# Patient Record
Sex: FEMALE | Race: WHITE | Employment: UNEMPLOYED | URBAN - METROPOLITAN AREA
[De-identification: names, ages, dates, MRNs, and addresses within clinical notes are randomized per-mention and may not be internally consistent; named-entity substitution may affect disease eponyms.]

---

## 2020-08-06 ENCOUNTER — OFFICE VISIT (OUTPATIENT)
Dept: CARDIOLOGY CLINIC | Age: 85
End: 2020-08-06
Payer: COMMERCIAL

## 2020-08-06 ENCOUNTER — HOSPITAL ENCOUNTER (OUTPATIENT)
Dept: CT IMAGING | Age: 85
Discharge: HOME | End: 2020-08-06
Attending: ORTHOPAEDIC SURGERY
Payer: MEDICARE

## 2020-08-06 VITALS
HEIGHT: 64 IN | HEART RATE: 108 BPM | DIASTOLIC BLOOD PRESSURE: 86 MMHG | BODY MASS INDEX: 26.29 KG/M2 | SYSTOLIC BLOOD PRESSURE: 132 MMHG | OXYGEN SATURATION: 96 % | WEIGHT: 154 LBS

## 2020-08-06 DIAGNOSIS — M16.12 PRIMARY OSTEOARTHRITIS OF LEFT HIP: ICD-10-CM

## 2020-08-06 DIAGNOSIS — Z01.810 PREOP CARDIOVASCULAR EXAM: ICD-10-CM

## 2020-08-06 DIAGNOSIS — I48.91 ATRIAL FIBRILLATION, UNSPECIFIED TYPE (HCC): ICD-10-CM

## 2020-08-06 DIAGNOSIS — I48.91 ATRIAL FIBRILLATION, UNSPECIFIED TYPE (HCC): Primary | ICD-10-CM

## 2020-08-06 PROCEDURE — G8427 DOCREV CUR MEDS BY ELIG CLIN: HCPCS | Performed by: SPECIALIST

## 2020-08-06 PROCEDURE — G8536 NO DOC ELDER MAL SCRN: HCPCS | Performed by: SPECIALIST

## 2020-08-06 PROCEDURE — 93000 ELECTROCARDIOGRAM COMPLETE: CPT | Performed by: SPECIALIST

## 2020-08-06 PROCEDURE — 1090F PRES/ABSN URINE INCON ASSESS: CPT | Performed by: SPECIALIST

## 2020-08-06 PROCEDURE — 1101F PT FALLS ASSESS-DOCD LE1/YR: CPT | Performed by: SPECIALIST

## 2020-08-06 PROCEDURE — 99204 OFFICE O/P NEW MOD 45 MIN: CPT | Performed by: SPECIALIST

## 2020-08-06 PROCEDURE — G8419 CALC BMI OUT NRM PARAM NOF/U: HCPCS | Performed by: SPECIALIST

## 2020-08-06 PROCEDURE — G8510 SCR DEP NEG, NO PLAN REQD: HCPCS | Performed by: SPECIALIST

## 2020-08-06 PROCEDURE — 73700 CT LOWER EXTREMITY W/O DYE: CPT

## 2020-08-06 RX ORDER — TRIAMTERENE/HYDROCHLOROTHIAZID 37.5-25 MG
TABLET ORAL DAILY
COMMUNITY

## 2020-08-06 RX ORDER — DILTIAZEM HYDROCHLORIDE 180 MG/1
180 CAPSULE, EXTENDED RELEASE ORAL DAILY
COMMUNITY

## 2020-08-06 NOTE — PROGRESS NOTES
PCP Jean-Claude Stone  Orthopaedic: Dr Bishop Pereira  Sx October 8, maybe moved  Hip CT scheduled today    Chest pain: no  Shortness of breath: no  Edema: YES  Palpitations: no  Dizziness: no

## 2020-08-06 NOTE — PROGRESS NOTES
LAST OFFICE VISIT : Visit date not found      No diagnosis found. ATTENTION:   This medical record was transcribed using an electronic medical records/speech recognition system. Although proofread, it may and can contain electronic, spelling and other errors. Corrections may be executed at a later time. Please feel free to contact us for any clarifications as needed. Dawson Uribe is a 80 y.o. male with  referred for cardiac preoperative risk stratification     . The patient denies chest pain/ shortness of breath, orthopnea, PND, LE edema, palpitations, syncope, presyncope or fatigue. I have personally obtained the history from the patient. HISTORY OF PRESENTING ILLNESS      Patient is a 89year-old lady with history of hypertension. She was followed in [facility]. She has non-smoker no history of hyperlipidemia or diabetes. She comes in with his son today. She states she has no chest pain or shortness of breath her cardiac work-up was negative about a year ago. Her rhythm is been stable she says no dizziness or lightheadedness and remains active but limited because of left hip pain. They come in for cardiac or stratification prior to her hip surgery       ACTIVE PROBLEM LIST     There are no active problems to display for this patient. PAST MEDICAL HISTORY     No past medical history on file. PAST SURGICAL HISTORY     No past surgical history on file. ALLERGIES     Not on File       FAMILY HISTORY     No family history on file. negative for cardiac disease       SOCIAL HISTORY            MEDICATIONS     No current outpatient medications on file. No current facility-administered medications for this visit. I have reviewed the nurses notes, vitals, problem list, allergy list, medical history, family, social history and medications. REVIEW OF SYMPTOMS      General: Pt denies excessive weight gain or loss.  Pt is able to conduct ADL's  HEENT: Denies blurred vision, headaches, hearing loss, epistaxis and difficulty swallowing. Respiratory: Denies cough, congestion, shortness of breath, LUU, wheezing or stridor. Cardiovascular: Denies precordial pain, palpitations, edema or PND  Gastrointestinal: Denies poor appetite, indigestion, abdominal pain or blood in stool  Genitourinary: Denies hematuria, dysuria, increased urinary frequency  Musculoskeletal: Denies joint pain or swelling from muscles or joints  Neurologic: Denies tremor, paresthesias, headache, or sensory motor disturbance  Psychiatric: Denies confusion, insomnia, depression  Integumentray: Denies rash, itching or ulcers. Hematologic: Denies easy bruising, bleeding     PHYSICAL EXAMINATION      There were no vitals filed for this visit. General: Well developed, in no acute distress. HEENT: No jaundice, oral mucosa moist, no oral ulcers  Neck: Supple, no stiffness, no lymphadenopathy, supple  Heart:  irregular  Respiratory: Clear bilaterally x 4, no wheezing or rales  Abdomen:   Soft, non-tender, bowel sounds are active.   Extremities:  Trace edema left ankle, normal cap refill, no cyanosis. Musculoskeletal: No clubbing, no deformities  Neuro: A&Ox3, speech clear, gait stable, cooperative, no focal neurologic deficits  Skin: Skin color is normal. No rashes or lesions. Non diaphoretic, moist.  Vascular: 2+ pulses symmetric in all extremities        EKG:      DIAGNOSTIC DATA     1.  Stress Test  4/23/19-Lexiscan- no ischemia, EF 67%    2. Echo  4/2019- normal EF, LAE, mild TR/MR           LABORATORY DATA          No results found for: WBC, HGBPOC, HGB, HGBP, HCTPOC, HCT, PHCT, RBCH, PLT, MCV, HGBEXT, HCTEXT, PLTEXT, HGBEXT, HCTEXT, PLTEXT   No results found for: NA, K, CL, CO2, AGAP, GLU, BUN, CREA, BUCR, GFRAA, GFRNA, CA, TBIL, TBILI, AP, TP, ALB, GLOB, AGRAT, ALT        ASSESSMENT/RECOMMENDATIONS:.      1.  Cardiac preoperative for stratification  -She has not had any cardiac symptoms or anginal equivalents and her recent stress test was in the last year so I favor going forward with her procedure and her hip replacement. I think she is a low cardiac operative risk and may proceed. I would hold the Eliquis 2 days prior to her procedure.  -She has some tachycardia may be just related to come to the office today but I am going to put a Holter monitor on her for 24 hours to see if she is persistently elevated and if she is and I would go ahead and increase her Cardizem from 1 80-2 40.  2.  Atrial fibrillation  -Initially diagnosed in April 2019  -She was placed on apixaban 2.5 mg twice a day  -See #1  3. Hypertension  -Is on triamterene/HCTZ  -BP is borderline  4. Breast cancer  5. I do not think I would order a cholesterol profile on her at this age    No orders of the defined types were placed in this encounter. I have discussed the diagnosis with  Van Rivero and the intended plan as seen in the above orders. Questions were answered concerning future plans. I have discussed medication side effects and warnings with the patient as well. Thank you,  No primary care provider on file. for involving me in the care of  Van Rivero. Please do not hesitate to contact me for further questions/concerns. Jaxon Henriquez MD, [facility] [place], [address] [place] [address]      [phone] / [phone] Fax

## 2020-08-10 ENCOUNTER — CLINICAL SUPPORT (OUTPATIENT)
Dept: CARDIOLOGY CLINIC | Age: 85
End: 2020-08-10

## 2020-08-10 NOTE — PROGRESS NOTES
Applied 24 hr holter per Dr Cheryle Payor dx: palps. Pt has #[id number] Chargeable visit. [employer] Holter didn't work. Needs to be repeated. I called & spoke to pts son & he will call back to [facility]. Non chargeable visit.

## 2020-08-18 ENCOUNTER — TELEPHONE (OUTPATIENT)
Dept: CARDIOLOGY CLINIC | Age: 85
End: 2020-08-18

## 2020-08-18 NOTE — TELEPHONE ENCOUNTER
I received an email this morning stating the 24 hr holter that pt wore on 8/10/20 per Dr Vladimir Londono dx: palps did not work. It showed too much artifact & needs to be repeated. I called & spoke to pts son regarding the need to repeat the holter. He stated he will talk to her & come up with a date that works for them then call back & schedule appt for [facility] office. This will be a n/c copay since monitor issue.

## 2020-08-19 NOTE — PROGRESS NOTES
Applied 24 hr holter per Dr Solomon Katz dx: palps. Pt has #[id number]   Chargeable visit. [facility]      Pts holter didn't work per [facility]. Needs to be repeated. Called & spoke to pts son & he will call back to [facility] appt. Non chargeable visit.

## 2020-08-19 NOTE — PROGRESS NOTES
HISTORY OF PRESENT ILLNESS Alyson Driscoll is a 80 y.o. female. Pts holter didn't work so needs to be repeated. No charge for this appt. ROS Physical Exam 
 
ASSESSMENT and PLAN 
{ASSESSMENT/PLAN:27367}